# Patient Record
Sex: MALE | Race: WHITE
[De-identification: names, ages, dates, MRNs, and addresses within clinical notes are randomized per-mention and may not be internally consistent; named-entity substitution may affect disease eponyms.]

---

## 2021-02-28 ENCOUNTER — HOSPITAL ENCOUNTER (EMERGENCY)
Dept: HOSPITAL 38 - CC.ED | Age: 46
Discharge: HOME | End: 2021-02-28
Payer: COMMERCIAL

## 2021-02-28 DIAGNOSIS — K08.89: Primary | ICD-10-CM

## 2021-02-28 DIAGNOSIS — I10: ICD-10-CM

## 2021-02-28 DIAGNOSIS — Z72.0: ICD-10-CM

## 2021-02-28 NOTE — EDM.PDOC
ED HPI GENERAL MEDICAL PROBLEM





- General


Chief Complaint: General


Stated Complaint: tooth ache


Time Seen by Provider: 02/28/21 17:26


Source of Information: Reports: Patient


History Limitations: Reports: No Limitations





- History of Present Illness


INITIAL COMMENTS - FREE TEXT/NARRATIVE: 





Patient presents to the ER. Patient is a 45 year old that reports right upper 

dental pain for 1 month. He reports has has not been insured so has delayed 

dental visit. Patient reports pain has gotten worse today. Patient also reports 

being out of his blood pressure medication, but reports he will be seeing his 

PCP tomorrow. Denies n, v, d, f, chest pain, shortness of breath. 


Onset: Other (1 month ago)


Duration: Getting Worse


Quality: Reports: Ache, Throbbing


Severity: Moderate


Improves with: Reports: None


Worsens with: Reports: None


Associated Symptoms: Reports: No Other Symptoms


  ** Right Tooth/Teeth


Pain Score (Numeric/FACES): 10





- Related Data


                                    Allergies











Allergy/AdvReac Type Severity Reaction Status Date / Time


 


No Known Allergies Allergy   Verified 02/28/21 17:09











Home Meds: 


                                    Home Meds





Amoxicillin 500 mg PO TID 10 Days #30 capsule 02/28/21 [Rx]


traMADol [Ultram] 50 mg PO Q4H PRN #12 tab 02/28/21 [Rx]











Past Medical History


Cardiovascular History: Reports: Hypertension


Musculoskeletal History: Reports: Back Pain, Chronic





- Past Surgical History


GI Surgical History: Reports: Appendectomy


Musculoskeletal Surgical History: Reports: Other (See Below)


Other Musculoskeletal Surgeries/Procedures:: back surgery





Social & Family History





- Tobacco Use


Tobacco Use Status *Q: Current Every Day Tobacco User


Years of Tobacco use: 30


Packs/Tins Daily: 0.5





- Caffeine Use


Caffeine Use: Reports: Coffee, Soda





- Recreational Drug Use


Recreational Drug Type: Reports: Marijuana/Hashish


Recreational Drug Use Frequency: Weekly





ED ROS GENERAL





- Review of Systems


Review Of Systems: See Below


Constitutional: Reports: No Symptoms


HEENT: Reports: Dental Pain


Respiratory: Reports: No Symptoms


Cardiovascular: Reports: No Symptoms


Endocrine: Reports: No Symptoms


GI/Abdominal: Reports: No Symptoms


: Reports: No Symptoms


Musculoskeletal: Reports: No Symptoms


Skin: Reports: No Symptoms


Neurological: Reports: No Symptoms


Psychiatric: Reports: No Symptoms


Hematologic/Lymphatic: Reports: No Symptoms


Immunologic: Reports: No Symptoms





ED EXAM, GENERAL





- Physical Exam


Exam: See Below


Exam Limited By: No Limitations


General Appearance: Alert, WD/WN, No Apparent Distress


Eye Exam: Bilateral Eye: EOMI, Normal Inspection, PERRL


Ears: Normal External Exam, Normal Canal, Hearing Grossly Normal, Normal TMs


Ear Exam: Bilateral Ear: Auricle Normal, Canal Normal, TM normal


Nose: Normal Inspection, Normal Mucosa, No Blood


Throat/Mouth: Normal Inspection, Normal Lips, Normal Teeth, Normal Gums, Normal 

Oropharynx, Normal Voice, No Airway Compromise, Other (I do not see abcess, 

swelling, erythema, ulcers. No trismus.)


Head: Atraumatic, Normocephalic.  No: Facial Swelling, Facial Tenderness, Sinus 

Tenderness


Neck: Normal Inspection, Supple, Non-Tender, Full Range of Motion


Respiratory/Chest: No Respiratory Distress, Lungs Clear, Normal Breath Sounds, 

No Accessory Muscle Use


Cardiovascular: Normal Peripheral Pulses, Regular Rate, Rhythm, No Edema, No 

Gallop, No JVD, No Murmur, No Rub


Peripheral Pulses: 2+: Radial (L), Radial (R)


Back Exam: Normal Inspection


Extremities: Normal Inspection, No Pedal Edema


Neurological: Alert, Oriented


Psychiatric: Normal Affect, Normal Mood


Skin Exam: Warm, Dry, Intact, Normal Color, No Rash


Lymphatic: No Adenopathy





Course





- Vital Signs


Last Recorded V/S: 





                                Last Vital Signs











Temp  96.7 F L  02/28/21 17:04


 


Pulse  80   02/28/21 17:04


 


Resp  16   02/28/21 17:04


 


BP  180/110 H  02/28/21 17:04


 


Pulse Ox  98   02/28/21 17:04














- Orders/Labs/Meds


Orders: 





                               Active Orders 24 hr











 Category Date Time Status


 


 Alum Hydrox/Mag Hydrox/Simeth [Mag-Al Plus] Med  02/28/21 17:34 Once





 30 ml PO ONETIME ONE   


 


 Lidocaine 2% [Xylocaine 2% Viscous] Med  02/28/21 17:34 Once





 15 ml PO ONETIME ONE   


 


 diphenhydrAMINE [Benadryl] Med  02/28/21 17:34 Stat





 25 mg PO NOW STA   











Meds: 





Medications














Discontinued Medications














Generic Name Dose Route Start Last Admin





  Trade Name Freq  PRN Reason Stop Dose Admin


 


Tramadol HCl  1 packet  02/28/21 17:33 





  Take Home: Tramadol 50 Mg, 4 Tab Pack  PO  02/28/21 17:34 





  ONETIME ONE  














Departure





- Departure


Time of Disposition: 17:35


Disposition: Home, Self-Care 01


Condition: Good


Clinical Impression: 


 Pain, dental








- Discharge Information


*PRESCRIPTION DRUG MONITORING PROGRAM REVIEWED*: Not Applicable


*COPY OF PRESCRIPTION DRUG MONITORING REPORT IN PATIENT INDER: Not Applicable


Prescriptions: 


Amoxicillin 500 mg PO TID 10 Days #30 capsule


traMADol [Ultram] 50 mg PO Q4H PRN #12 tab


 PRN Reason: Pain


Instructions:  Dental Abscess


Referrals: 


Linda Card PA-C [Primary Care Provider] - 


Additional Instructions: 


Followup with a dentist by calling tomorrow


Followup with primary care provider concerning your blood pressure


Return to the ER for worsening of condition or any emergent concerns


Amoxicillin 500mg 1 pill three times a day for 10 days #30 no refill : [facility] to 

[facility] [place]


Ultram 50mg 1-2 pills every 4-6 hours as needed for pain #4 take home: #12 no 

refill


Take Ultram only if Tylenol and/or Motrin is not working


Cotton balls applied to painful tooth as needed for pain: Mixed with Maalox, 

Lidocaine, and Benadryl





Sepsis Event Note (ED)





- Evaluation


Sepsis Screening Result: No Definite Risk





- Focused Exam


Vital Signs: 





                                   Vital Signs











  Temp Pulse Resp BP Pulse Ox


 


 02/28/21 17:04  96.7 F L  80  16  180/110 H  98














- My Orders


Last 24 Hours: 





My Active Orders





02/28/21 17:34


Alum Hydrox/Mag Hydrox/Simeth [Mag-Al Plus]   30 ml PO ONETIME ONE 


Lidocaine 2% [Xylocaine 2% Viscous]   15 ml PO ONETIME ONE 





02/28/21 17:34


diphenhydrAMINE [Benadryl]   25 mg PO NOW STA 














- Assessment/Plan


Last 24 Hours: 





My Active Orders





02/28/21 17:34


Alum Hydrox/Mag Hydrox/Simeth [Mag-Al Plus]   30 ml PO ONETIME ONE 


Lidocaine 2% [Xylocaine 2% Viscous]   15 ml PO ONETIME ONE 





02/28/21 17:34


diphenhydrAMINE [Benadryl]   25 mg PO NOW STA 











Plan: 





PLEASE SEE RN NOTE FOR PFSH.